# Patient Record
Sex: MALE | Race: WHITE
[De-identification: names, ages, dates, MRNs, and addresses within clinical notes are randomized per-mention and may not be internally consistent; named-entity substitution may affect disease eponyms.]

---

## 2018-04-13 ENCOUNTER — HOSPITAL ENCOUNTER (EMERGENCY)
Dept: HOSPITAL 62 - ER | Age: 43
Discharge: HOME | End: 2018-04-13
Payer: COMMERCIAL

## 2018-04-13 VITALS — DIASTOLIC BLOOD PRESSURE: 78 MMHG | SYSTOLIC BLOOD PRESSURE: 143 MMHG

## 2018-04-13 DIAGNOSIS — Z88.5: ICD-10-CM

## 2018-04-13 DIAGNOSIS — Z98.890: ICD-10-CM

## 2018-04-13 DIAGNOSIS — I10: ICD-10-CM

## 2018-04-13 DIAGNOSIS — M54.2: ICD-10-CM

## 2018-04-13 DIAGNOSIS — R25.2: ICD-10-CM

## 2018-04-13 DIAGNOSIS — M60.9: Primary | ICD-10-CM

## 2018-04-13 DIAGNOSIS — Z79.899: ICD-10-CM

## 2018-04-13 DIAGNOSIS — Z98.84: ICD-10-CM

## 2018-04-13 LAB
ADD MANUAL DIFF: NO
ALBUMIN SERPL-MCNC: 4.2 G/DL (ref 3.5–5)
ALP SERPL-CCNC: 213 U/L (ref 38–126)
ALT SERPL-CCNC: 225 U/L (ref 21–72)
ANION GAP SERPL CALC-SCNC: 15 MMOL/L (ref 5–19)
AST SERPL-CCNC: 80 U/L (ref 17–59)
BASOPHILS # BLD AUTO: 0 10^3/UL (ref 0–0.2)
BASOPHILS NFR BLD AUTO: 0.2 % (ref 0–2)
BILIRUB DIRECT SERPL-MCNC: 0.5 MG/DL (ref 0–0.4)
BILIRUB SERPL-MCNC: 0.6 MG/DL (ref 0.2–1.3)
BUN SERPL-MCNC: 17 MG/DL (ref 7–20)
CALCIUM: 9.4 MG/DL (ref 8.4–10.2)
CHLORIDE SERPL-SCNC: 99 MMOL/L (ref 98–107)
CO2 SERPL-SCNC: 25 MMOL/L (ref 22–30)
EOSINOPHIL # BLD AUTO: 0 10^3/UL (ref 0–0.6)
EOSINOPHIL NFR BLD AUTO: 0.1 % (ref 0–6)
ERYTHROCYTE [DISTWIDTH] IN BLOOD BY AUTOMATED COUNT: 14.1 % (ref 11.5–14)
GLUCOSE SERPL-MCNC: 127 MG/DL (ref 75–110)
HCT VFR BLD CALC: 43.4 % (ref 37.9–51)
HGB BLD-MCNC: 14.7 G/DL (ref 13.5–17)
LYMPHOCYTES # BLD AUTO: 1.1 10^3/UL (ref 0.5–4.7)
LYMPHOCYTES NFR BLD AUTO: 10.6 % (ref 13–45)
MCH RBC QN AUTO: 27.3 PG (ref 27–33.4)
MCHC RBC AUTO-ENTMCNC: 33.9 G/DL (ref 32–36)
MCV RBC AUTO: 81 FL (ref 80–97)
MONOCYTES # BLD AUTO: 1.5 10^3/UL (ref 0.1–1.4)
MONOCYTES NFR BLD AUTO: 13.8 % (ref 3–13)
NEUTROPHILS # BLD AUTO: 8 10^3/UL (ref 1.7–8.2)
NEUTS SEG NFR BLD AUTO: 75.3 % (ref 42–78)
PLATELET # BLD: 333 10^3/UL (ref 150–450)
POTASSIUM SERPL-SCNC: 4.3 MMOL/L (ref 3.6–5)
PROT SERPL-MCNC: 7.4 G/DL (ref 6.3–8.2)
RBC # BLD AUTO: 5.38 10^6/UL (ref 4.35–5.55)
SODIUM SERPL-SCNC: 139.2 MMOL/L (ref 137–145)
TOTAL CELLS COUNTED % (AUTO): 100 %
WBC # BLD AUTO: 10.6 10^3/UL (ref 4–10.5)

## 2018-04-13 PROCEDURE — 96374 THER/PROPH/DIAG INJ IV PUSH: CPT

## 2018-04-13 PROCEDURE — 85025 COMPLETE CBC W/AUTO DIFF WBC: CPT

## 2018-04-13 PROCEDURE — 70491 CT SOFT TISSUE NECK W/DYE: CPT

## 2018-04-13 PROCEDURE — 99284 EMERGENCY DEPT VISIT MOD MDM: CPT

## 2018-04-13 PROCEDURE — 36415 COLL VENOUS BLD VENIPUNCTURE: CPT

## 2018-04-13 PROCEDURE — 80053 COMPREHEN METABOLIC PANEL: CPT

## 2018-04-13 PROCEDURE — 96361 HYDRATE IV INFUSION ADD-ON: CPT

## 2018-04-13 PROCEDURE — 96375 TX/PRO/DX INJ NEW DRUG ADDON: CPT

## 2018-04-13 NOTE — RADIOLOGY REPORT (SQ)
EXAM DESCRIPTION:  CT SOFT TISSUE NECK WITH



COMPLETED DATE/TIME:  4/13/2018 7:22 pm



REASON FOR STUDY:  Neck  Mouth Swelling post L 2nd molar extraction



COMPARISON:  None.



TECHNIQUE:  Post IV contrasted scanning from skull base through lung apices with review of bone, soft
 tissue and lung windows.  Reconstructed coronal and sagittal MPR images reviewed.  All images stored
 on PACS.

All CT scanners at this facility use dose modulation, iterative reconstruction, and/or weight based d
osing when appropriate to reduce radiation dose to as low as reasonably achievable (ALARA).

[facility]: Dose Right  [facility]: CareDose    [facility]: Dose Right    [facility]: Teradose 4D    [facility]: Smart Technologies



CONTRAST TYPE AND DOSE:  contrast/concentration: Isovue 370.00 mg/ml; Total Contrast Delivered: 75.0 
ml; Total Saline Delivered: 55.0 ml



RENAL FUNCTION:  None required. The patient is less than 50 years old.



RADIATION DOSE:  CT Rad equipment meets quality standard of care and radiation dose reduction techniq
ues were employed. CTDIvol: 16.0 mGy. DLP: 601 mGy-cm. .



LIMITATIONS:  None.



FINDINGS:  SKULL BASE: Intact.

MAJOR SALIVARY GLANDS: No solid or cystic masses.  No inflammatory changes.

LYMPHADENOPATHY: Prominent left anterior cervical lymph nodes which are likely reactive.

SOFT TISSUES: There is diffuse soft tissue stranding superficial and deep to the left mandible surrou
nding site of tooth extraction with expansion of the muscles of mastication.  No definite drainable f
luid collection/abscess.

LARYNX/CORDS: No abnormal findings.

VASCULAR STRUCTURES: The major vessels are patent.

LUNG APICES: Clear.

BONES: Intact.

THYROID: Normal size.  No masses.

PARANASAL SINUSES: Clear.

OTHER: No other significant finding.



IMPRESSION:  SIGNIFICANT INFLAMMATION INVOLVING THE SOFT TISSUES SUPERFICIAL AND DEEP TO THE LEFT MAN
DIBLE NEAR SITE OF TOOTH EXTRACTION WITH EXPANSION OF THE MUSCLES OF MASTICATION MAY REPRESENT COMBIN
ATION OF CELLULITIS/ MYOSITIS AND/OR HEMATOMA.  NO DRAINABLE FLUID COLLECTION/ ABSCESS IDENTIFIED.



TECHNICAL DOCUMENTATION:  JOB ID:  [id number]

Quality ID # 436: Final reports with documentation of one or more dose reduction techniques (e.g., Au
tomated exposure control, adjustment of the mA and/or kV according to patient size, use of iterative 
reconstruction technique)

 2011 [employer]- All Rights Reserved



Reading location - [facility]

## 2018-04-13 NOTE — ER DOCUMENT REPORT
ED General





- General


Chief Complaint: Dental Injury


Stated Complaint: TOOTH PAIN


Time Seen by Provider: 04/13/18 17:53


Notes: 





Patient is a 42 year old male with medical history as recorded who presents 

with 1 week of progressively worsening pain and swelling to the left side of 

his face, jaw and neck.  Patient states that he had a tooth extraction 

performed 2 days ago for an infected left mandibular molar and that the 

swelling and pain has actually worsened since that time.  He does described as 

a dull, constant, throbbing pain worsened by any attempts at talking, eating or 

drinking.  He has been taking amoxicillin that was prescribed by his dentist.  

He reports that when he followed up with the dentist today he was referred to 

the emergency department for further evaluation due to the worsening of the 

swelling.  He denies any history of similar symptoms in the past.  He denies 

any difficulty breathing or swallowing his oral secretions.  He has no history 

of similar symptoms in the past.


TRAVEL OUTSIDE OF THE [place] IN LAST 30 DAYS: No





- Related Data


Allergies/Adverse Reactions: 


 





codeine Allergy (Verified 04/13/18 17:31)


 








Home Medications: Bystolic 5mg daily





Past Medical History





- General


Information source: Patient





- Social History


Smoking Status: Never Smoker


Chew tobacco use (# tins/day): No


Frequency of alcohol use: None


Drug Abuse: None


Lives with: Spouse/Significant other


Family History: Reviewed & Not Pertinent


Patient has suicidal ideation: No


Patient has homicidal ideation: No





- Past Medical History


Cardiac Medical History: Reports: Hx Hypertension


Renal/ Medical History: Denies: Hx Peritoneal Dialysis


Past Surgical History: Reports: Hx Abdominal Surgery - gastric bypass, Hx 

Orthopedic Surgery





Review of Systems





- Review of Systems


Notes: 





Constitutional: Negative for fever.


HENT: Positive for facial pain and swelling


Eyes: Negative for visual changes.


Cardiovascular: Negative for chest pain.


Respiratory: Negative for shortness of breath.


Gastrointestinal: Negative for abdominal pain, vomiting or diarrhea.


Genitourinary: Negative for dysuria.


Musculoskeletal: Negative for back pain.


Skin: Negative for rash.


Neurological: Negative for headaches, weakness or numbness.





10 point ROS negative except as marked above and in HPI.





Physical Exam





- Vital signs


Vitals: 


 











Temp Pulse Resp BP Pulse Ox


 


 97.7 F   64   18   134/91 H  99 


 


 04/13/18 17:31  04/13/18 17:31  04/13/18 17:31  04/13/18 17:31  04/13/18 17:31











Interpretation: Normal


Notes: 





PHYSICAL EXAMINATION:





GENERAL: Appears moderately uncomfortable but in no acute distress





HEAD: Atraumatic, normocephalic.





EYES: Pupils equal round and reactive to light, extraocular movements intact, 

sclera anicteric, conjunctiva are normal.





ENT: nares patent, notable trismus.  There is left-sided subsegmental 

lymphadenopathy that is tender to palpation.  The soft tissues around and 

underneath the mandible are diffusely swollen and tender to palpation.  There 

is no evidence of swelling to the floor of the mouth.  No elevation of the 

tongue.  No muffled voice.





NECK: Normal range of motion, no cervical lymphadenopathy,  no stridor





LUNGS: Breath sounds clear to auscultation bilaterally and equal.  No wheezes 

rales or rhonchi.





HEART: Regular rate and rhythm without murmurs





ABDOMEN: Soft, nontender, normoactive bowel sounds.  No guarding, no rebound.  

No masses appreciated.





EXTREMITIES: Normal range of motion, no pitting or edema.  No cyanosis.





NEUROLOGICAL: No focal neurological deficits. Moves all extremities 

spontaneously and on command.





PSYCH: Normal mood, normal affect.





SKIN: Warm, Dry, normal turgor, no rashes or lesions noted.





Course





- Re-evaluation


Re-evalutation: 





04/13/18 19:10


Patient presents with trismus, left-sided facial pain and swelling after having 

tooth #19 pulled 2 days ago.  Patient's vitals are within normal limits he has 

no difficulty swallowing or secretions, no complaints of shortness of breath, 

no evidence of David's angina on examination although he does have significant 

trismus on examination.  No muffled speech with talking.  No clinical suspicion 

overall for a retropharyngeal or David's angina although given the patient's 

degree of trismus and is worsening despite the source of infection being pulled 

and antibiotics will obtain CT of the face and neck to further exclude.  If 

this is unremarkable will recommend outpatient steroid course, antibiotics and 

follow-up.


04/13/18 20:37


CT does not demonstrate any evidence of an acute fluid collection to suggest an 

abscess or David angina.  Findings are consistent with patient clinically 

appears to have which is postsurgical inflammation to multiple areas beneath 

the left mandible and affected soft tissues.  I will broaden the patient's 

antibiotics to clindamycin, start a 5 day course of steroids, and have him 

follow-up with his dentist as scheduled.  He is able to tolerate fluid intake 

and continues to be without any breathing difficulties.  At this time will 

discharge with return precautions and follow-up recommendations.  Verbal 

discharge instructions given a the bedside and opportunity for questions given. 

Medication warnings reviewed. Patient is in agreement with this plan and has 

verbalized understanding of return precautions and the need for primary care 

follow-up in the next 24-72 hours.





- Vital Signs


Vital signs: 


 











Temp Pulse Resp BP Pulse Ox


 


 98.3 F   70   16   143/78 H  96 


 


 04/13/18 19:09  04/13/18 21:00  04/13/18 21:00  04/13/18 21:00  04/13/18 21:00














- Laboratory


Result Diagrams: 


 04/13/18 18:27





 04/13/18 18:27


Laboratory results interpreted by me: 


 











  04/13/18 04/13/18





  18:27 18:27


 


WBC  10.6 H 


 


RDW  14.1 H 


 


Lymphocytes %  10.6 L 


 


Monocytes %  13.8 H 


 


Absolute Monocytes  1.5 H 


 


Glucose   127 H


 


Direct Bilirubin   0.5 H


 


AST   80 H


 


ALT   225 H


 


Alkaline Phosphatase   213 H














- Diagnostic Test


Radiology reviewed: Reports reviewed





Discharge





- Discharge


Clinical Impression: 


 Trismus, Neck pain





Muscle inflammation


Qualifiers:


 Myositis type: unspecified type Myositis location: unspecified site Qualified 

Code(s): M60.9 - Myositis, unspecified





Condition: Good


Disposition: HOME, SELF-CARE


Additional Instructions: 


Your CT scan does not show any evidence of an abscess or the diagnosis that 

your dentist was concerned about.  It does show significant inflammation which 

is what we suspected based on your history.  You are being started on a course 

of steroids to help reduce some of the inflammation.  Please discontinue 

amoxicillin and begin taking clindamycin for the next 10 days.  This is a more 

broad-spectrum antibiotic and we are increasing the antibiotic coverage due to 

the amount of swelling that you are having.  Please return if you become unable 

to swallow, have difficulty breathing, have persistent vomiting, or have any 

other symptoms that are worrisome to you.


Prescriptions: 


Clindamycin HCl 300 mg PO TID #30 capsule


Nebivolol HCl [Bystolic 5 mg Tablet] 5 mg PO DAILY #30 tablet


Prednisone [Deltasone 20 mg Tablet] 3 tab PO DAILY 5 Days  tablet

## 2018-04-13 NOTE — ER DOCUMENT REPORT
ED Medical Screen (RME)





- General


Chief Complaint: Dental Injury


Stated Complaint: TOOTH PAIN


Time Seen by Provider: 04/13/18 17:53


Notes: 





42-year-old male patient had a left lower second molar pulled 2 days ago.  He 

reports third molars never erupted.  Since then he has had pain and swelling.  

He is unable to open his mouth at this time.  The dentist sent him here to rule 

out David's angina due to his symptoms and findings in the office.





I have greeted and performed a rapid initial assessment of this patient.  A 

comprehensive ED assessment and evaluation of the patient, analysis of test 

results and completion of the medical decision making process will be conducted 

by additional ED providers.


TRAVEL OUTSIDE OF THE [place] IN LAST 30 DAYS: No





- Related Data


Allergies/Adverse Reactions: 


 





codeine Allergy (Verified 04/13/18 17:31)


 











Past Medical History





- Social History


Chew tobacco use (# tins/day): No


Frequency of alcohol use: None


Drug Abuse: None


Renal/ Medical History: Denies: Hx Peritoneal Dialysis





Physical Exam





- Vital signs


Vitals: 





 











Temp Pulse Resp BP Pulse Ox


 


 97.7 F   64   18   134/91 H  99 


 


 04/13/18 17:31  04/13/18 17:31  04/13/18 17:31  04/13/18 17:31  04/13/18 17:31














Course





- Vital Signs


Vital signs: 





 











Temp Pulse Resp BP Pulse Ox


 


 97.7 F   64   18   134/91 H  99 


 


 04/13/18 17:31  04/13/18 17:31  04/13/18 17:31  04/13/18 17:31  04/13/18 17:31

## 2018-04-16 ENCOUNTER — HOSPITAL ENCOUNTER (EMERGENCY)
Dept: HOSPITAL 62 - ER | Age: 43
Discharge: HOME | End: 2018-04-16
Payer: COMMERCIAL

## 2018-04-16 VITALS — DIASTOLIC BLOOD PRESSURE: 75 MMHG | SYSTOLIC BLOOD PRESSURE: 138 MMHG

## 2018-04-16 DIAGNOSIS — K08.9: ICD-10-CM

## 2018-04-16 DIAGNOSIS — M54.2: ICD-10-CM

## 2018-04-16 DIAGNOSIS — I10: ICD-10-CM

## 2018-04-16 DIAGNOSIS — Z88.6: ICD-10-CM

## 2018-04-16 DIAGNOSIS — Z98.84: ICD-10-CM

## 2018-04-16 DIAGNOSIS — M60.08: ICD-10-CM

## 2018-04-16 DIAGNOSIS — R06.00: ICD-10-CM

## 2018-04-16 DIAGNOSIS — R25.2: Primary | ICD-10-CM

## 2018-04-16 LAB
ADD MANUAL DIFF: NO
ALBUMIN SERPL-MCNC: 4.2 G/DL (ref 3.5–5)
ALP SERPL-CCNC: 177 U/L (ref 38–126)
ALT SERPL-CCNC: 284 U/L (ref 21–72)
ANION GAP SERPL CALC-SCNC: 11 MMOL/L (ref 5–19)
AST SERPL-CCNC: 227 U/L (ref 17–59)
BASOPHILS # BLD AUTO: 0 10^3/UL (ref 0–0.2)
BASOPHILS NFR BLD AUTO: 0.1 % (ref 0–2)
BILIRUB DIRECT SERPL-MCNC: 0.2 MG/DL (ref 0–0.4)
BILIRUB SERPL-MCNC: 0.4 MG/DL (ref 0.2–1.3)
BUN SERPL-MCNC: 25 MG/DL (ref 7–20)
CALCIUM: 9.4 MG/DL (ref 8.4–10.2)
CHLORIDE SERPL-SCNC: 98 MMOL/L (ref 98–107)
CO2 SERPL-SCNC: 30 MMOL/L (ref 22–30)
CRP SERPL-MCNC: 23.4 MG/L (ref ?–10)
EOSINOPHIL # BLD AUTO: 0 10^3/UL (ref 0–0.6)
EOSINOPHIL NFR BLD AUTO: 0 % (ref 0–6)
ERYTHROCYTE [DISTWIDTH] IN BLOOD BY AUTOMATED COUNT: 14 % (ref 11.5–14)
GLUCOSE SERPL-MCNC: 153 MG/DL (ref 75–110)
HCT VFR BLD CALC: 44.2 % (ref 37.9–51)
HGB BLD-MCNC: 14.4 G/DL (ref 13.5–17)
LYMPHOCYTES # BLD AUTO: 0.8 10^3/UL (ref 0.5–4.7)
LYMPHOCYTES NFR BLD AUTO: 5.3 % (ref 13–45)
MCH RBC QN AUTO: 26.6 PG (ref 27–33.4)
MCHC RBC AUTO-ENTMCNC: 32.6 G/DL (ref 32–36)
MCV RBC AUTO: 81 FL (ref 80–97)
MONOCYTES # BLD AUTO: 1.4 10^3/UL (ref 0.1–1.4)
MONOCYTES NFR BLD AUTO: 9.9 % (ref 3–13)
NEUTROPHILS # BLD AUTO: 11.9 10^3/UL (ref 1.7–8.2)
NEUTS SEG NFR BLD AUTO: 84.7 % (ref 42–78)
PLATELET # BLD: 473 10^3/UL (ref 150–450)
POTASSIUM SERPL-SCNC: 5.1 MMOL/L (ref 3.6–5)
PROT SERPL-MCNC: 7.3 G/DL (ref 6.3–8.2)
RBC # BLD AUTO: 5.43 10^6/UL (ref 4.35–5.55)
SODIUM SERPL-SCNC: 138.6 MMOL/L (ref 137–145)
TOTAL CELLS COUNTED % (AUTO): 100 %
WBC # BLD AUTO: 14.1 10^3/UL (ref 4–10.5)

## 2018-04-16 PROCEDURE — 80053 COMPREHEN METABOLIC PANEL: CPT

## 2018-04-16 PROCEDURE — 86140 C-REACTIVE PROTEIN: CPT

## 2018-04-16 PROCEDURE — 87040 BLOOD CULTURE FOR BACTERIA: CPT

## 2018-04-16 PROCEDURE — 99284 EMERGENCY DEPT VISIT MOD MDM: CPT

## 2018-04-16 PROCEDURE — 36415 COLL VENOUS BLD VENIPUNCTURE: CPT

## 2018-04-16 PROCEDURE — 85025 COMPLETE CBC W/AUTO DIFF WBC: CPT

## 2018-04-16 PROCEDURE — 70491 CT SOFT TISSUE NECK W/DYE: CPT

## 2018-04-16 PROCEDURE — 96365 THER/PROPH/DIAG IV INF INIT: CPT

## 2018-04-16 PROCEDURE — 96375 TX/PRO/DX INJ NEW DRUG ADDON: CPT

## 2018-04-16 PROCEDURE — 83605 ASSAY OF LACTIC ACID: CPT

## 2018-04-16 NOTE — ER DOCUMENT REPORT
ED Medical Screen (RME)





- General


Chief Complaint: Mouth Problem


Stated Complaint: DIFFICULTY BREATHING


Time Seen by Provider: 04/16/18 11:01


Mode of Arrival: Ambulatory


Information source: Patient, Parent


TRAVEL OUTSIDE OF THE [place] IN LAST 30 DAYS: No





- HPI


Notes: 





04/16/18 11:09


42 yr old male presents today for acute swelling, jaw pain on the left side 

after he had his left lower tooth extracted on 4/11/18, was placed on augmentin 

and then returned on Friday night on 4/13/18 and had a CT of soft tissue neck  

pt is taking oral antibiotics and oral steroids. Reports symptoms are 

progressivly worse. reports trismus, but denies difficulty to swallowing. Pt is 

not drinking and eating well due to pain and swelling of left side of neck. 

denies any fevers or chills. denies any cp, sob, n/v/d.  





I have greeted and performed a rapid initial assessment of this patient.  A 

comprehensive ED assessment and evaluation of the patient, analysis of test 

results and completion of medical decision making process will be conducted by 

an additional ED providers.





- Related Data


Allergies/Adverse Reactions: 


 





codeine Allergy (Verified 04/16/18 10:37)


 











Past Medical History





- Social History


Frequency of alcohol use: None


Drug Abuse: None





- Past Medical History


Cardiac Medical History: Reports: Hx Hypertension


Renal/ Medical History: Denies: Hx Peritoneal Dialysis


Past Surgical History: Reports: Hx Abdominal Surgery - gastric bypass, Hx 

Orthopedic Surgery





Physical Exam





- Vital signs


Vitals: 





 











Temp Pulse Resp BP Pulse Ox


 


 97.9 F   50 L  16   155/82 H  97 


 


 04/16/18 10:52  04/16/18 10:52  04/16/18 10:52  04/16/18 10:52  04/16/18 10:52














- HEENT


Neck: Lymphadenopathy, Neck mass


Notes: 





noted trismus. tongue midline. 





- Respiratory


Respiratory status: No respiratory distress


Chest status: Nontender


Breath sounds: Normal


Chest palpation: Normal





- Cardiovascular


Rhythm: Regular


Heart sounds: Normal auscultation


Murmur: No


Normal capillary refill: Yes





- Skin


Skin Temperature: Warm


Skin Moisture: Dry


Skin Color: Normal





Course





- Vital Signs


Vital signs: 





 











Temp Pulse Resp BP Pulse Ox


 


 97.9 F   50 L  16   155/82 H  97 


 


 04/16/18 10:52  04/16/18 10:52  04/16/18 10:52  04/16/18 10:52  04/16/18 10:52

## 2018-04-16 NOTE — ER DOCUMENT REPORT
ED General





- General


Chief Complaint: Mouth Problem


Stated Complaint: DIFFICULTY BREATHING


Time Seen by Provider: 04/16/18 11:01


Mode of Arrival: Ambulatory


Information source: Patient


Notes: 





42-year-old male presents with complaints of dental pain.  Patient notes 

symptoms have been ongoing for approximately a week now, she he states that he 

had a dental infection was started on amoxicillin by his dentist had his tooth 

pulled and and the swelling worsened.  Patient notes that he went to visit with 

the dentist who is concerned about David's angina and sent the patient in for 

evaluation here, a CT was performed no abscess was noted.  Patient was 

discharged home at that time with clindamycin and steroids.  Patient notes he 

has not been able to eat however open his mouth due to the pain.


TRAVEL OUTSIDE OF THE [place] IN LAST 30 DAYS: No





- HPI


Onset: Last week


Onset/Duration: Persistent


Quality of pain: Sharp


Severity: Moderate


Pain Level: 4


Associated symptoms: Other


Exacerbated by: Food


Relieved by: Denies


Similar symptoms previously: Yes


Recently seen / treated by doctor: Yes





- Related Data


Allergies/Adverse Reactions: 


 





codeine Allergy (Verified 04/16/18 10:37)


 











Past Medical History





- General


Information source: Patient, Parent





- Social History


Smoking Status: Never Smoker


Cigarette use (# per day): No


Chew tobacco use (# tins/day): No


Smoking Education Provided: No


Frequency of alcohol use: None


Drug Abuse: None


Family History: Reviewed & Not Pertinent


Patient has suicidal ideation: No


Patient has homicidal ideation: No





- Past Medical History


Cardiac Medical History: Reports: Hx Hypertension


Renal/ Medical History: Denies: Hx Peritoneal Dialysis


Past Surgical History: Reports: Hx Abdominal Surgery - gastric bypass, Hx 

Orthopedic Surgery





Review of Systems





- Review of Systems


Notes: 





REVIEW OF SYSTEMS:


CONSTITUTIONAL :  Denies fever,  chills, or sweats.  Denies recent illness.


EENT: Admits to dental pain


CARDIOVASCULAR:  Denies chest pain.  Denies palpitations or racing or irregular 

heart beat.  Denies ankle edema.


RESPIRATORY:  Denies cough, cold, or chest congestion.  Denies shortness of 

breath, difficulty breathing, or wheezing.


GASTROINTESTINAL:  Denies abdominal pain or distention.  Denies nausea, vomiting

, or diarrhea.  Denies blood in vomitus, stools, or per rectum.  Denies black, 

tarry stools.  Denies constipation.  


GENITOURINARY:  Denies difficulty urinating, painful urination, burning, 

frequency, blood in urine, or discharge.


MUSCULOSKELETAL:  Denies back or neck pain or stiffness.  Denies joint pain or 

swelling.


SKIN:   Denies rash, lesions or sores.


HEMATOLOGIC :   Denies easy bruising or bleeding.


LYMPHATIC:  Denies swollen, enlarged glands.


NEUROLOGICAL:  Denies confusion or altered mental status.  Denies passing out 

or loss of consciousness.  Denies dizziness or lightheadedness.  Denies 

headache.  Denies weakness or paralysis or loss of use of either side.  Denies 

problems with gait or speech.  Denies sensory loss, numbness, or tingling.  

Denies seizures.


PSYCHIATRIC:  Denies anxiety or stress.  Denies depression, suicidal ideation, 

or homicidal ideation.





ALL OTHER SYSTEMS REVIEWED AND NEGATIVE.





Dictation was performed using Dragon voice recognition software 





PHYSICAL EXAMINATION:





GENERAL: Well-appearing, well-nourished and in no acute distress.





HEAD: Atraumatic, normocephalic.





EYES: Pupils equal round and reactive to light, extraocular movements intact, 

sclera anicteric, conjunctiva are normal.





ENT: Trismus noted mild edema of the left


There is anterior cervical adenopathy noted


NECK: Normal range of motion,





LUNGS: Breath sounds clear to auscultation bilaterally and equal.  No wheezes 

rales or rhonchi.





HEART: Regular rate and rhythm without murmurs





ABDOMEN: Soft, nontender, nondistended abdomen.  No guarding, no rebound.  No 

masses appreciated.





Musculoskeletal: Normal range of motion, no pitting or edema.  No cyanosis.





NEUROLOGICAL: Cranial nerves grossly intact.  Normal speech, normal gait.  

Normal sensory, motor exams 





PSYCH: Normal mood, normal affect.





SKIN: Warm, Dry, normal turgor, no rashes or lesions noted.





Physical Exam





- Vital signs


Vitals: 


 











Temp Pulse Resp BP Pulse Ox


 


 97.9 F   50 L  16   155/82 H  97 


 


 04/16/18 10:52  04/16/18 10:52  04/16/18 10:52  04/16/18 10:52  04/16/18 10:52














Course





- Re-evaluation


Re-evalutation: 


Patient overall looks well, vital signs are stable, he does appear to be in 

pain and has been given multiple doses of pain medication in the emergency 

department, CT was performed which is noted to have no abscess however the 

medial r pterygoid is noted to be swollen


04/16/18 13:54


Spoke with Dr Unger , he requests unasyn 3mg, taper of prednisone for 10 days,, 

augmentin for home and follow up 1300 on thursday I explained the patient CT 

findings and lab results to him he understands that this is the reason why he 

cannot open his mouth.


04/16/18 15:58


Patient otherwise is stable, he will be given further pain control and will be 

discharged to follow-up with ENT as there is no airway compromise at this time








After performing a Medical Screening Examination, I estimate there is LOW risk 

for CENTRAL CORD SYNDROME, LUDWIGS ANGINA, PERITONSILLAR ABSCESS, 

RETROPHARYNGEAL ABSCESS, EPIDURAL MASS LESION, SEVERE SPINAL STENOSIS, ARTERIAL 

DISSECTION, MENINGITIS, or ACUTE CORONARY SYNDROME, thus I consider the 

discharge disposition reasonable.  I have reevaluated this patient multiple 

times and no significant life threatening changes are noted. The patient and I 

have discussed the diagnosis and risks, and we agree with discharging home to 

follow-up on an outpatient basis with the understanding that symptoms and 

presentations can change. We also discussed returning to the Emergency 

Department immediately if new or worsening symptoms occur. We have discussed 

the symptoms which are most concerning (e.g., saddle anesthesia, urinary or 

bowel incontinence or retention, changing or worsening pain) that necessitate 

immediate return.





- Vital Signs


Vital signs: 


 











Temp Pulse Resp BP Pulse Ox


 


 97.9 F   50 L  16   155/82 H  97 


 


 04/16/18 10:52  04/16/18 10:52  04/16/18 10:52  04/16/18 10:52  04/16/18 10:52














- Laboratory


Result Diagrams: 


 04/16/18 12:04





 04/16/18 12:04


Laboratory results interpreted by me: 


 











  04/16/18 04/16/18





  12:04 12:04


 


WBC  14.1 H 


 


MCH  26.6 L 


 


Plt Count  473 H 


 


Seg Neutrophils %  84.7 H 


 


Lymphocytes %  5.3 L 


 


Absolute Neutrophils  11.9 H 


 


Potassium   5.1 H


 


BUN   25 H


 


Glucose   153 H


 


AST   227 H


 


ALT   284 H


 


Alkaline Phosphatase   177 H


 


C-Reactive Protein   23.4 H














- Diagnostic Test


Radiology reviewed: Image reviewed - Soft tissue swelling noted on CT soft 

tissue neck otherwise no abscess, Reports reviewed





Discharge





- Discharge


Clinical Impression: 


 Trismus, Neck pain





Muscle inflammation


Qualifiers:


 Myositis type: infective Myositis location: other site Qualified Code(s): 

M60.08 - Infective myositis, other site





Condition: Stable


Disposition: HOME, SELF-CARE


Additional Instructions: 


Please stop the clindamycin that was written for you, please take the Augmentin 

that was previously prescribed.  Please continue the longer course of steroids, 

please return immediately if symptoms worsen or any other concerns 


Prescriptions: 


Oxycodone HCl/Acetaminophen [Percocet 5-325 mg Tablet] 1 - 2 tab PO Q4H PRN #25 

tablet


 PRN Reason: 


Prednisone [Deltasone 20 mg Tablet] 3 tab PO DAILY 5 Days  tablet


Referrals: 


JEFF UNGER DO [ASSOCIATE] - 04/19/18 1:00 pm

## 2018-04-16 NOTE — RADIOLOGY REPORT (SQ)
EXAM DESCRIPTION:  CT SOFT TISSUE NECK WITH



COMPLETED DATE/TIME:  4/16/2018 12:38 pm



REASON FOR STUDY:  left sided neck swelling s/p extraction



COMPARISON:  4/13/2018.



TECHNIQUE:  Post IV contrasted scanning from skull base through lung apices with review of bone, soft
 tissue and lung windows.  Reconstructed coronal and sagittal MPR images reviewed.  All images stored
 on PACS.

All CT scanners at this facility use dose modulation, iterative reconstruction, and/or weight based d
osing when appropriate to reduce radiation dose to as low as reasonably achievable (ALARA).

[facility]: Dose Right  [facility]: CareDose    [facility]: Dose Right    [facility]: Teradose 4D    [facility]: Smart [employer]



CONTRAST TYPE AND DOSE:  contrast/concentration: Isovue 370.00 mg/ml; Total Contrast Delivered: 75.0 
ml; Total Saline Delivered: 55.0 ml



RENAL FUNCTION:  BUN 17 creatinine 0.5.



RADIATION DOSE:  CT Rad equipment meets quality standard of care and radiation dose reduction techniq
ues were employed. CTDIvol: 15.6 mGy. DLP: 577 mGy-cm. .



LIMITATIONS:  None.



FINDINGS:  SKULL BASE: Intact.

MAJOR SALIVARY GLANDS: No solid or cystic masses.  No inflammatory changes.

LYMPHADENOPATHY: Left side cervical adenopathy.

MUCOSAL MASSES OR ASYMMETRY: No mucosal masses or asymmetry.

LARYNX/CORDS: No abnormal findings.

VASCULAR STRUCTURES: The major vessels are patent.

LUNG APICES: Clear.

BONES: Intact.

THYROID: Normal size.  No masses.

PARANASAL SINUSES: Clear.

OTHER: Again seen is asymmetry and fullness involving the left muscles of mastication, particularly t
he medial pterygoid muscle.  Faint decreased attenuation centrally but no focal fluid collection.



IMPRESSION:  AGAIN SEEN IS INFLAMMATION AND FULLNESS INVOLVING THE SOFT TISSUES DEEP TO THE LEFT EVANGELINA
IBLE.  NO CLEAR-CUT ABSCESS AT THIS TIME.  LEFT-SIDED CERVICAL ADENOPATHY.



TECHNICAL DOCUMENTATION:  JOB ID:  [id number]

Quality ID # 436: Final reports with documentation of one or more dose reduction techniques (e.g., Au
tomated exposure control, adjustment of the mA and/or kV according to patient size, use of iterative 
reconstruction technique)

 2011 [employer]- All Rights Reserved



Reading location - IP/workstation name: [facility]